# Patient Record
Sex: FEMALE | Race: WHITE | NOT HISPANIC OR LATINO | Employment: UNEMPLOYED | URBAN - NONMETROPOLITAN AREA
[De-identification: names, ages, dates, MRNs, and addresses within clinical notes are randomized per-mention and may not be internally consistent; named-entity substitution may affect disease eponyms.]

---

## 2018-12-17 ENCOUNTER — OFFICE VISIT (OUTPATIENT)
Dept: OBSTETRICS AND GYNECOLOGY | Facility: CLINIC | Age: 33
End: 2018-12-17

## 2018-12-17 VITALS — WEIGHT: 168.2 LBS | SYSTOLIC BLOOD PRESSURE: 112 MMHG | DIASTOLIC BLOOD PRESSURE: 72 MMHG | BODY MASS INDEX: 28.87 KG/M2

## 2018-12-17 DIAGNOSIS — Z98.890 POSTOPERATIVE STATE: Primary | ICD-10-CM

## 2018-12-17 PROCEDURE — 99024 POSTOP FOLLOW-UP VISIT: CPT | Performed by: OBSTETRICS & GYNECOLOGY

## 2018-12-24 NOTE — PROGRESS NOTES
Concepción Eli is a 33 y.o. y/o female.     Chief Complaint: Postoperative ablation    HPI:   33 y.o. y/o No obstetric history on file..  No LMP recorded..  Patient is seen is postop and her endometrial ablation doing well some slight bleeding          Review of Systems   ROS:  CNS: No history of brain malignancy  HEENT: No history of throat cancer  Eye: No history of retinal cancer  Pulmonary: No history of lung cancer                                                                                 Cardiovascular: No history of cardiac tumors  Gastrointestinal: No history of small bowel tumors  Renal: No history of kidney  Musculoskeletal: No history of smooth muscle tumors  Lymphatics: No history of of Hodgkin's disease  Endocrine: No history of thyroid malignancy    The following portions of the patient's history were reviewed and updated as appropriate: allergies, current medications, past family history, past medical history, past social history, past surgical history and problem list.    Allergies   Allergen Reactions   • Avelox [Moxifloxacin]    • Ceclor [Cefaclor]    • Imitrex [Sumatriptan]         Outpatient Medications Prior to Visit   Medication Sig Dispense Refill   • amoxicillin (AMOXIL) 875 MG tablet Take 1 tablet by mouth 2 (Two) Times a Day. 20 tablet 0   • neomycin-polymyxin-hydrocortisone (CORTISPORIN) 3.5-60515-4 otic solution Administer 3 drops into the left ear 4 (Four) Times a Day. 10 mL 0   • norethindrone (AYGESTIN) 5 MG tablet 10 MG PO DAILY 60 tablet 1     No facility-administered medications prior to visit.         The patient has a family history of   Family History   Problem Relation Age of Onset   • Breast cancer Maternal Aunt         Past Medical History:   Diagnosis Date   • Anxiety    • Migraine         OB History     No data available           Social History     Socioeconomic History   • Marital status: Single     Spouse name: Not on file   • Number of children: Not on file   •  Years of education: Not on file   • Highest education level: Not on file   Social Needs   • Financial resource strain: Not on file   • Food insecurity - worry: Not on file   • Food insecurity - inability: Not on file   • Transportation needs - medical: Not on file   • Transportation needs - non-medical: Not on file   Occupational History   • Not on file   Tobacco Use   • Smoking status: Never Smoker   • Smokeless tobacco: Never Used   Substance and Sexual Activity   • Alcohol use: Yes     Comment: socially   • Drug use: No   • Sexual activity: Yes     Partners: Male     Birth control/protection: None   Other Topics Concern   • Not on file   Social History Narrative   • Not on file        Past Surgical History:   Procedure Laterality Date   •  SECTION     • DILATATION AND CURETTAGE     • HYSTEROSCOPY     • TUBAL ABDOMINAL LIGATION          There is no problem list on file for this patient.       Documented Vitals    18 0944   BP: 112/72   Weight: 76.3 kg (168 lb 3.2 oz)   PainSc: 0-No pain        Body mass index is 28.87 kg/m².    Physical Exam lungs clear heart regular rate and rhythm abdomen soft nontender extremities negative    Assessment        Diagnosis Plan   1. Postoperative state                  1. Encouraged in diet and exercise.  2. Handouts on depression, hot flashes, exercise, and vitamin use.   3. Follow-up in6 weeks.  Follow-up sooner as needed.          This document has been electronically signed by Delroy Scott MD on 2018 9:50 PM

## 2019-01-08 ENCOUNTER — TELEPHONE (OUTPATIENT)
Dept: OBSTETRICS AND GYNECOLOGY | Facility: CLINIC | Age: 34
End: 2019-01-08

## 2019-01-08 RX ORDER — VALACYCLOVIR HYDROCHLORIDE 1 G/1
1000 TABLET, FILM COATED ORAL DAILY
Qty: 30 TABLET | Refills: 12 | Status: SHIPPED | OUTPATIENT
Start: 2019-01-08 | End: 2020-09-01

## 2019-01-08 NOTE — TELEPHONE ENCOUNTER
----- Message from Magda Montesinos sent at 1/8/2019 12:36 PM CST -----  Contact: [phone]  PATIENT STATES THAT DR SANDERS WAS SUPPOSE TO SEND IN A PRESCRIPTION FOR FEVER BLISTERS TO [facility].. AND IT HASNT BEEN SENT YET

## 2019-01-08 NOTE — TELEPHONE ENCOUNTER
Called and left a msg on the pt's voicemail that I sent over the rx that she was requesting to [facility] in [place] and to call with any questions.

## 2019-02-22 ENCOUNTER — OFFICE VISIT (OUTPATIENT)
Dept: OBSTETRICS AND GYNECOLOGY | Facility: CLINIC | Age: 34
End: 2019-02-22

## 2019-02-22 VITALS
BODY MASS INDEX: 28.61 KG/M2 | DIASTOLIC BLOOD PRESSURE: 66 MMHG | SYSTOLIC BLOOD PRESSURE: 100 MMHG | HEIGHT: 64 IN | WEIGHT: 167.6 LBS

## 2019-02-22 DIAGNOSIS — N92.0 MENORRHAGIA WITH REGULAR CYCLE: Primary | ICD-10-CM

## 2019-02-22 DIAGNOSIS — Z86.69 HX OF MIGRAINE HEADACHES: ICD-10-CM

## 2019-02-22 PROCEDURE — 99213 OFFICE O/P EST LOW 20 MIN: CPT | Performed by: OBSTETRICS & GYNECOLOGY

## 2019-02-22 RX ORDER — TOPIRAMATE 50 MG/1
50 TABLET, FILM COATED ORAL 2 TIMES DAILY
Qty: 60 TABLET | Refills: 0 | Status: SHIPPED | OUTPATIENT
Start: 2019-02-22 | End: 2019-03-24

## 2019-02-23 NOTE — PROGRESS NOTES
Concepción Eli is a 34 y.o. y/o female.     Chief Complaint: Follow-up treatment menorrhagia; headaches    HPI:   34 y.o. y/o No obstetric history on file..  Patient's last menstrual period was 02/04/2019 (approximate)..  Patient is seen in follow-up after an ablation for menorrhagia she says overall she is satisfied she is having some bleeding but is much reduced only 2 days/month and very light.  I warned her it may increase over time.  She also complains of migraine headaches has been on Topamax in the past having a hard time getting in with a neurologist wants to restart          Review of Systems   ROS:  CNS: No history of brain malignancy  HEENT: No history of throat cancer  Eye: No history of retinal cancer  Pulmonary: No history of lung cancer                                                                                 Cardiovascular: No history of cardiac tumors  Gastrointestinal: No history of small bowel tumors  Renal: No history of kidney  Musculoskeletal: No history of smooth muscle tumors  Lymphatics: No history of of Hodgkin's disease  Endocrine: No history of thyroid malignancy    The following portions of the patient's history were reviewed and updated as appropriate: allergies, current medications, past family history, past medical history, past social history, past surgical history and problem list.    Allergies   Allergen Reactions   • Avelox [Moxifloxacin]    • Ceclor [Cefaclor]    • Imitrex [Sumatriptan]         Outpatient Medications Prior to Visit   Medication Sig Dispense Refill   • valACYclovir (VALTREX) 1000 MG tablet Take 1 tablet by mouth Daily. 30 tablet 12     No facility-administered medications prior to visit.         The patient has a family history of   Family History   Problem Relation Age of Onset   • Breast cancer Maternal Aunt         Past Medical History:   Diagnosis Date   • Anxiety    • Migraine         OB History     No data available           Social History  "    Socioeconomic History   • Marital status: Single     Spouse name: Not on file   • Number of children: Not on file   • Years of education: Not on file   • Highest education level: Not on file   Social Needs   • Financial resource strain: Not on file   • Food insecurity - worry: Not on file   • Food insecurity - inability: Not on file   • Transportation needs - medical: Not on file   • Transportation needs - non-medical: Not on file   Occupational History   • Not on file   Tobacco Use   • Smoking status: Never Smoker   • Smokeless tobacco: Never Used   Substance and Sexual Activity   • Alcohol use: Yes     Comment: socially   • Drug use: No   • Sexual activity: Yes     Partners: Male     Birth control/protection: None   Other Topics Concern   • Not on file   Social History Narrative   • Not on file        Past Surgical History:   Procedure Laterality Date   •  SECTION     • DILATATION AND CURETTAGE     • HYSTEROSCOPY     • TUBAL ABDOMINAL LIGATION          There is no problem list on file for this patient.       Documented Vitals    19 1415   BP: 100/66   Weight: 76 kg (167 lb 9.6 oz)   Height: 162.6 cm (64\")   PainSc: 0-No pain        Body mass index is 28.77 kg/m².    Physical Exam constitutional appears to be in no acute distress lungs clear heart regular rate and rhythm abdomen soft nontender extremities negative neuro physiologic psychiatric O x4    Assessment       No diagnosis found.      Plan         New Medications Ordered This Visit   Medications   • topiramate (TOPAMAX) 50 MG tablet     Sig: Take 1 tablet by mouth 2 (Two) Times a Day for 30 days.     Dispense:  60 tablet     Refill:  0     1. Encouraged in diet and exercise.  2. Handouts on depression, hot flashes, exercise, and vitamin use.   3. Follow-up in 1 year.  Follow-up sooner as needed.          This document has been electronically signed by Delroy Scott MD on 2019 2:06 AM    "

## 2019-04-01 ENCOUNTER — TELEPHONE (OUTPATIENT)
Dept: OBSTETRICS AND GYNECOLOGY | Facility: CLINIC | Age: 34
End: 2019-04-01

## 2019-04-12 RX ORDER — TOPIRAMATE 100 MG/1
100 TABLET, FILM COATED ORAL 2 TIMES DAILY
Qty: 60 TABLET | Refills: 0 | Status: SHIPPED | OUTPATIENT
Start: 2019-04-12 | End: 2019-06-14 | Stop reason: SDUPTHER

## 2019-06-14 RX ORDER — TOPIRAMATE 100 MG/1
100 TABLET, FILM COATED ORAL 2 TIMES DAILY
Qty: 60 TABLET | Refills: 0 | Status: SHIPPED | OUTPATIENT
Start: 2019-06-14 | End: 2019-07-11 | Stop reason: SDUPTHER

## 2019-07-12 RX ORDER — TOPIRAMATE 100 MG/1
100 TABLET, FILM COATED ORAL 2 TIMES DAILY
Qty: 60 TABLET | Refills: 0 | Status: SHIPPED | OUTPATIENT
Start: 2019-07-12 | End: 2019-08-15 | Stop reason: SDUPTHER

## 2019-07-19 ENCOUNTER — TELEPHONE (OUTPATIENT)
Dept: OBSTETRICS AND GYNECOLOGY | Facility: CLINIC | Age: 34
End: 2019-07-19

## 2019-07-19 NOTE — TELEPHONE ENCOUNTER
PT LEFT MSG ON VM THAT SHE IS NEEDING A REFILL ON TOPAMAX 100 MG TO [facility] IN [place].  HER PHONE NUMBER [phone]

## 2019-08-15 RX ORDER — TOPIRAMATE 100 MG/1
100 TABLET, FILM COATED ORAL 2 TIMES DAILY
Qty: 60 TABLET | Refills: 0 | Status: SHIPPED | OUTPATIENT
Start: 2019-08-15 | End: 2019-09-29 | Stop reason: SDUPTHER

## 2019-09-30 RX ORDER — TOPIRAMATE 100 MG/1
100 TABLET, FILM COATED ORAL 2 TIMES DAILY
Qty: 60 TABLET | Refills: 0 | Status: SHIPPED | OUTPATIENT
Start: 2019-09-30 | End: 2019-10-27 | Stop reason: SDUPTHER

## 2019-10-28 RX ORDER — TOPIRAMATE 100 MG/1
100 TABLET, FILM COATED ORAL 2 TIMES DAILY
Qty: 60 TABLET | Refills: 0 | Status: SHIPPED | OUTPATIENT
Start: 2019-10-28 | End: 2019-11-27

## 2019-12-17 RX ORDER — TOPIRAMATE 100 MG/1
100 TABLET, FILM COATED ORAL 2 TIMES DAILY
Qty: 60 TABLET | Refills: 0 | OUTPATIENT
Start: 2019-12-17 | End: 2020-01-16

## 2019-12-18 ENCOUNTER — TELEPHONE (OUTPATIENT)
Dept: OBSTETRICS AND GYNECOLOGY | Facility: CLINIC | Age: 34
End: 2019-12-18

## 2020-01-17 DIAGNOSIS — Z86.69 HX OF MIGRAINE HEADACHES: Primary | ICD-10-CM

## 2020-01-17 RX ORDER — TOPIRAMATE 100 MG/1
100 TABLET, FILM COATED ORAL 2 TIMES DAILY
Qty: 60 TABLET | Refills: 11 | Status: SHIPPED | OUTPATIENT
Start: 2020-01-17 | End: 2021-01-19

## 2020-01-31 ENCOUNTER — LAB (OUTPATIENT)
Dept: LAB | Facility: HOSPITAL | Age: 35
End: 2020-01-31

## 2020-01-31 ENCOUNTER — PROCEDURE VISIT (OUTPATIENT)
Dept: OBSTETRICS AND GYNECOLOGY | Facility: CLINIC | Age: 35
End: 2020-01-31

## 2020-01-31 VITALS
DIASTOLIC BLOOD PRESSURE: 62 MMHG | WEIGHT: 138.2 LBS | SYSTOLIC BLOOD PRESSURE: 100 MMHG | BODY MASS INDEX: 23.6 KG/M2 | HEIGHT: 64 IN

## 2020-01-31 DIAGNOSIS — R22.0 MASS OF CHIN: Primary | ICD-10-CM

## 2020-01-31 DIAGNOSIS — Z12.4 CERVICAL CANCER SCREENING: ICD-10-CM

## 2020-01-31 DIAGNOSIS — N94.10 FEMALE DYSPAREUNIA: ICD-10-CM

## 2020-01-31 LAB
BASOPHILS # BLD AUTO: 0.08 10*3/MM3 (ref 0–0.2)
BASOPHILS NFR BLD AUTO: 1.3 % (ref 0–1.5)
CRP SERPL-MCNC: <0.03 MG/DL (ref 0–0.5)
DEPRECATED RDW RBC AUTO: 37.5 FL (ref 37–54)
EOSINOPHIL # BLD AUTO: 0.18 10*3/MM3 (ref 0–0.4)
EOSINOPHIL NFR BLD AUTO: 2.8 % (ref 0.3–6.2)
ERYTHROCYTE [DISTWIDTH] IN BLOOD BY AUTOMATED COUNT: 11.9 % (ref 12.3–15.4)
HCG SERPL QL: NEGATIVE
HCT VFR BLD AUTO: 40.2 % (ref 34–46.6)
HGB BLD-MCNC: 13.8 G/DL (ref 12–15.9)
IMM GRANULOCYTES # BLD AUTO: 0.01 10*3/MM3 (ref 0–0.05)
IMM GRANULOCYTES NFR BLD AUTO: 0.2 % (ref 0–0.5)
LYMPHOCYTES # BLD AUTO: 1.6 10*3/MM3 (ref 0.7–3.1)
LYMPHOCYTES NFR BLD AUTO: 25 % (ref 19.6–45.3)
MCH RBC QN AUTO: 29.9 PG (ref 26.6–33)
MCHC RBC AUTO-ENTMCNC: 34.3 G/DL (ref 31.5–35.7)
MCV RBC AUTO: 87 FL (ref 79–97)
MONOCYTES # BLD AUTO: 0.5 10*3/MM3 (ref 0.1–0.9)
MONOCYTES NFR BLD AUTO: 7.8 % (ref 5–12)
NEUTROPHILS # BLD AUTO: 4.02 10*3/MM3 (ref 1.7–7)
NEUTROPHILS NFR BLD AUTO: 62.9 % (ref 42.7–76)
NRBC BLD AUTO-RTO: 0 /100 WBC (ref 0–0.2)
PLATELET # BLD AUTO: 246 10*3/MM3 (ref 140–450)
PMV BLD AUTO: 12.4 FL (ref 6–12)
RBC # BLD AUTO: 4.62 10*6/MM3 (ref 3.77–5.28)
WBC NRBC COR # BLD: 6.39 10*3/MM3 (ref 3.4–10.8)

## 2020-01-31 PROCEDURE — 85025 COMPLETE CBC W/AUTO DIFF WBC: CPT

## 2020-01-31 PROCEDURE — G0123 SCREEN CERV/VAG THIN LAYER: HCPCS | Performed by: OBSTETRICS & GYNECOLOGY

## 2020-01-31 PROCEDURE — 87624 HPV HI-RISK TYP POOLED RSLT: CPT | Performed by: OBSTETRICS & GYNECOLOGY

## 2020-01-31 PROCEDURE — 36415 COLL VENOUS BLD VENIPUNCTURE: CPT

## 2020-01-31 PROCEDURE — 99395 PREV VISIT EST AGE 18-39: CPT | Performed by: OBSTETRICS & GYNECOLOGY

## 2020-01-31 PROCEDURE — 84703 CHORIONIC GONADOTROPIN ASSAY: CPT

## 2020-01-31 PROCEDURE — 86140 C-REACTIVE PROTEIN: CPT

## 2020-01-31 NOTE — PROGRESS NOTES
"Chief complaint here for Pap smear; dyspareunia    SUBJECTIVE:   34 y.o. female for annual routine Pap and checkup.  Patient complains of also dyspareunia is been bothersome significantly for about 3 to 4 months worse with deep penetration sharp in nature.  She has had laparoscopy in the past at worst 6 of 10  Current Outpatient Medications   Medication Sig Dispense Refill   • topiramate (TOPAMAX) 100 MG tablet Take 1 tablet by mouth 2 (Two) Times a Day. 60 tablet 11   • valACYclovir (VALTREX) 1000 MG tablet Take 1 tablet by mouth Daily. 30 tablet 12     No current facility-administered medications for this visit.      Allergies: Avelox [moxifloxacin]; Ceclor [cefaclor]; and Imitrex [sumatriptan]   Patient's last menstrual period was 01/21/2020.    ROS:  Feeling well. No dyspnea or chest pain on exertion.  No abdominal pain, change in bowel habits, black or bloody stools.  No urinary tract symptoms. GYN ROS: normal menses, no abnormal bleeding, pelvic pain or discharge, no breast pain or new or enlarging lumps on self exam. No neurological complaints.    OBJECTIVE:   The patient appears well, alert, oriented x 3, in no distress.  /62   Ht 162.6 cm (64\")   Wt 62.7 kg (138 lb 3.2 oz)   LMP 01/21/2020   BMI 23.72 kg/m²   ENT normal.  Neck supple. No adenopathy or thyromegaly. CAT. Lungs are clear, good air entry, no wheezes, rhonchi or rales. S1 and S2 normal, no murmurs, regular rate and rhythm. Abdomen soft without tenderness, guarding, mass or organomegaly. Extremities show no edema, normal peripheral pulses. Neurological is normal, no focal findings.    BREAST EXAM: not examined    PELVIC EXAM: normal external genitalia, vulva, vagina, cervix, uterus and adnexa mild diffuse pelvic tenderness    ASSESSMENT:   well woman    PLAN:   mammogram  pap smear  return annually or prn  We will get ultrasound and laboratory work to evaluate dyspareunia  "

## 2020-02-04 LAB
GEN CATEG CVX/VAG CYTO-IMP: NORMAL
LAB AP CASE REPORT: NORMAL
LAB AP GYN ADDITIONAL INFORMATION: NORMAL
PATH INTERP SPEC-IMP: NORMAL
STAT OF ADQ CVX/VAG CYTO-IMP: NORMAL

## 2020-02-06 ENCOUNTER — OFFICE VISIT (OUTPATIENT)
Dept: OBSTETRICS AND GYNECOLOGY | Facility: CLINIC | Age: 35
End: 2020-02-06

## 2020-02-06 VITALS
HEIGHT: 64 IN | BODY MASS INDEX: 23.76 KG/M2 | SYSTOLIC BLOOD PRESSURE: 128 MMHG | DIASTOLIC BLOOD PRESSURE: 76 MMHG | WEIGHT: 139.2 LBS

## 2020-02-06 DIAGNOSIS — R22.0 MASS OF CHIN: ICD-10-CM

## 2020-02-06 DIAGNOSIS — N80.03 ADENOMYOSIS: ICD-10-CM

## 2020-02-06 DIAGNOSIS — R10.32 LEFT LOWER QUADRANT PAIN: ICD-10-CM

## 2020-02-06 DIAGNOSIS — R10.2 FEMALE PELVIC PAIN: Primary | ICD-10-CM

## 2020-02-06 LAB — HPV I/H RISK 4 DNA CVX QL PROBE+SIG AMP: NEGATIVE

## 2020-02-06 PROCEDURE — 99213 OFFICE O/P EST LOW 20 MIN: CPT | Performed by: OBSTETRICS & GYNECOLOGY

## 2020-02-06 NOTE — PROGRESS NOTES
Chief complaint follow-up pelvic pain left lower quadrant pain area left mandible    Ultrasound today is reviewed with the patient it is really pretty much negative except that the uterine images are suggestive of adenomyosis.  I reviewed the risks benefits alternatives of another laparoscopy versus hysterectomy.  He wants to consider with her family probably be followed conservatively for now.    Preliminary on the ultrasound of the mandible shows a relatively small lymph node and she says the area is getting smaller and it does seem to be getting smaller.  We will plan to recheck for both issues in about 4 weeks. I spent 17 minutes making  more than 50% of this encounter, being spent in counseling and coordination of care.

## 2020-02-17 ENCOUNTER — TELEPHONE (OUTPATIENT)
Dept: OBSTETRICS AND GYNECOLOGY | Facility: CLINIC | Age: 35
End: 2020-02-17

## 2020-02-18 DIAGNOSIS — B37.31 VAGINAL YEAST INFECTION: Primary | ICD-10-CM

## 2020-02-18 RX ORDER — FLUCONAZOLE 150 MG/1
150 TABLET ORAL DAILY
Qty: 2 TABLET | Refills: 2 | OUTPATIENT
Start: 2020-02-18 | End: 2021-04-05

## 2020-03-06 ENCOUNTER — APPOINTMENT (OUTPATIENT)
Dept: LAB | Facility: HOSPITAL | Age: 35
End: 2020-03-06

## 2020-03-06 ENCOUNTER — OFFICE VISIT (OUTPATIENT)
Dept: OBSTETRICS AND GYNECOLOGY | Facility: CLINIC | Age: 35
End: 2020-03-06

## 2020-03-06 VITALS
BODY MASS INDEX: 23.52 KG/M2 | SYSTOLIC BLOOD PRESSURE: 104 MMHG | WEIGHT: 137.8 LBS | HEIGHT: 64 IN | DIASTOLIC BLOOD PRESSURE: 70 MMHG

## 2020-03-06 DIAGNOSIS — R22.0 MASS OF CHIN: ICD-10-CM

## 2020-03-06 DIAGNOSIS — R10.32 LEFT LOWER QUADRANT PAIN: ICD-10-CM

## 2020-03-06 DIAGNOSIS — R10.2 FEMALE PELVIC PAIN: Primary | ICD-10-CM

## 2020-03-06 LAB
PROLACTIN SERPL-MCNC: 9.25 NG/ML (ref 4.79–23.3)
T4 FREE SERPL-MCNC: 1.08 NG/DL (ref 0.93–1.7)
TSH SERPL DL<=0.05 MIU/L-ACNC: 1.87 UIU/ML (ref 0.27–4.2)

## 2020-03-06 PROCEDURE — 84146 ASSAY OF PROLACTIN: CPT | Performed by: OBSTETRICS & GYNECOLOGY

## 2020-03-06 PROCEDURE — 84443 ASSAY THYROID STIM HORMONE: CPT | Performed by: OBSTETRICS & GYNECOLOGY

## 2020-03-06 PROCEDURE — 36415 COLL VENOUS BLD VENIPUNCTURE: CPT | Performed by: OBSTETRICS & GYNECOLOGY

## 2020-03-06 PROCEDURE — 99213 OFFICE O/P EST LOW 20 MIN: CPT | Performed by: OBSTETRICS & GYNECOLOGY

## 2020-03-06 PROCEDURE — 84439 ASSAY OF FREE THYROXINE: CPT | Performed by: OBSTETRICS & GYNECOLOGY

## 2020-03-06 NOTE — PROGRESS NOTES
Concepción Eli is a 35 y.o. y/o female.     Chief Complaint: Follow-up    HPI:   35 y.o. No obstetric history on file..  Patient's last menstrual period was 03/05/2020..  Patient is seen in follow-up for pelvic pain and mass left shin.  She continues to have pelvic pain left side worse on menstrual cycle at worst 6 of 10 radiation to back somewhat better nonsteroidal anti-inflammatories          Review of Systems   ROS:  CNS: No history of brain malignancy.  HEENT: No history of throat cancer.  Eye: No history of retinal cancer.  Pulmonary: No history of lung cancer.                                                                                 Cardiovascular: No history of cardiac tumors.  Gastrointestinal: No history of small bowel tumors.  Renal: No history of kidney malignancy.  Musculoskeletal: No history of smooth muscle tumors.  Lymphatics: No history of Hodgkin's disease.  Endocrine: No history of thyroid malignancy.    The following portions of the patient's history were reviewed and updated as appropriate: allergies, current medications, past family history, past medical history, past social history, past surgical history and problem list.    Allergies   Allergen Reactions   • Avelox [Moxifloxacin] Hives   • Ceclor [Cefaclor] Hives   • Imitrex [Sumatriptan] Hives        Outpatient Medications Prior to Visit   Medication Sig Dispense Refill   • topiramate (TOPAMAX) 100 MG tablet Take 1 tablet by mouth 2 (Two) Times a Day. 60 tablet 11   • valACYclovir (VALTREX) 1000 MG tablet Take 1 tablet by mouth Daily. 30 tablet 12   • fluconazole (DIFLUCAN) 150 MG tablet Take 1 tablet by mouth Daily. 2 tablet 2     No facility-administered medications prior to visit.         The patient has a family history of   Family History   Problem Relation Age of Onset   • Breast cancer Maternal Aunt         Past Medical History:   Diagnosis Date   • Anxiety    • Migraine         OB History    None          Social History  "    Socioeconomic History   • Marital status: Single     Spouse name: Not on file   • Number of children: Not on file   • Years of education: Not on file   • Highest education level: Not on file   Tobacco Use   • Smoking status: Never Smoker   • Smokeless tobacco: Never Used   Substance and Sexual Activity   • Alcohol use: Yes     Comment: socially   • Drug use: No   • Sexual activity: Yes     Partners: Male     Birth control/protection: None        Past Surgical History:   Procedure Laterality Date   •  SECTION     • DILATATION AND CURETTAGE     • HYSTEROSCOPY     • TUBAL ABDOMINAL LIGATION          Patient Active Problem List   Diagnosis   • Cervical cancer screening   • Female dyspareunia   • Mass of chin   • Female pelvic pain   • Adenomyosis   • Left lower quadrant pain        Documented Vitals    20 0836   BP: 104/70   Weight: 62.5 kg (137 lb 12.8 oz)   Height: 162.6 cm (64\")   PainSc:   4        Body mass index is 23.65 kg/m².    Physical Exam  Constitutional: Appears to be in no acute distress; Eyes: sclera normal; Endocrine system: thyroid palpate is normal; Pulmonary system: lungs clear; Cardiovascular system: heart regular rate and rhythm; Gastrointestinal system: abdomen soft nontender, active bowel sounds; Urologic system: CVA negative; Psychiatric: appropriate insight; Neurologic: gait within normal limits the mass on the left mandible has almost resolved    Laboratory Data:   No results for input(s): GLUCOSE, BUN, CREATININE, NA, K, CL, CO2, CALCIUM, PROTEINTOT, ALBUMIN, ALT, AST, ALKPHOS, BILITOT, EGFRIFNONA, GLOB, AGRATIO, BCR, ANIONGAP, BILIDIR, INDBILI in the last 70336 hours.  Lab Results - Last 18 Months   Lab Units 20  1041   WBC 10*3/mm3 6.39   RBC 10*6/mm3 4.62   HEMOGLOBIN g/dL 13.8   HEMATOCRIT % 40.2   MCV fL 87.0   MCH pg 29.9   MCHC g/dL 34.3   RDW % 11.9*   RDW-SD fl 37.5   MPV fL 12.4*   PLATELETS 10*3/mm3 246     Lab Results - Last 18 Months   Lab Units " 01/31/20  1041   HCG QUALITATIVE  Negative       Assessment        Diagnosis Plan   1. Female pelvic pain  TSH+Free T4    Prolactin   2. Mass of chin   mass resolved   3. Left lower quadrant pain   discussed options with patient wants to be followed conservatively for now         Plan       No orders of the defined types were placed in this encounter.            This document has been electronically signed by Delroy Scott MD on March 6, 2020 2:23 PM    Please note that portions of this note were completed with a voice recognition program.

## 2020-03-09 ENCOUNTER — DOCUMENTATION (OUTPATIENT)
Dept: OBSTETRICS AND GYNECOLOGY | Facility: CLINIC | Age: 35
End: 2020-03-09

## 2020-09-01 RX ORDER — VALACYCLOVIR HYDROCHLORIDE 1 G/1
TABLET, FILM COATED ORAL
Qty: 30 TABLET | Refills: 12 | Status: SHIPPED | OUTPATIENT
Start: 2020-09-01 | End: 2021-03-12 | Stop reason: SDUPTHER

## 2020-09-18 ENCOUNTER — OFFICE VISIT (OUTPATIENT)
Dept: OBSTETRICS AND GYNECOLOGY | Facility: CLINIC | Age: 35
End: 2020-09-18

## 2020-09-18 VITALS
WEIGHT: 145 LBS | DIASTOLIC BLOOD PRESSURE: 78 MMHG | SYSTOLIC BLOOD PRESSURE: 100 MMHG | BODY MASS INDEX: 24.75 KG/M2 | HEIGHT: 64 IN

## 2020-09-18 DIAGNOSIS — N64.4 BREAST PAIN, LEFT: Primary | ICD-10-CM

## 2020-09-18 PROCEDURE — 99213 OFFICE O/P EST LOW 20 MIN: CPT | Performed by: NURSE PRACTITIONER

## 2020-09-18 NOTE — PROGRESS NOTES
Subjective   Chief Complaint   Patient presents with   • Breast Mass     Concepción Eli is a 35 y.o. year old  presenting to be seen because of left breast pain at the 3 o'clock position. The pain started about 6 weeks ago and feels like a burning sensation that goes from the nipple into the axilla. It is achy nearly constantly and the burning sensation is intermittent but several times throughout the day. She has not taken any OTC meds or tried any comfort measures to relieve the discomfort and hasn't noticed that anything makes it worse..    · Last mammogram: was done on approximately 2018 and the result was: Birads II (Benign findings).  · Last breast MRI: was done on approximately 7/30/15 and the result was Birads II (Benign findings).    Maternal aunt dx with BRCA at age 39. Her mother has had a lumpectomy but it was benign and her younger sister has had a couple of breast biopsies but they were benign as well.      The following portions of the patient's history were reviewed and updated as appropriate:problem list, current medications, allergies, past family history, past medical history, past social history and past surgical history    Review of Systems   Constitutional: Negative for activity change, appetite change, chills, diaphoresis, fatigue, fever, unexpected weight gain and unexpected weight loss.   Respiratory: Negative for chest tightness and shortness of breath.    Cardiovascular: Negative for chest pain and palpitations.   Endocrine: Negative.    Genitourinary: Positive for breast pain. Negative for amenorrhea, breast discharge, breast lump, menstrual problem and pelvic pain.   Musculoskeletal: Negative for myalgias.   Skin: Negative for color change, dry skin, pallor, rash and skin lesions.   Allergic/Immunologic: Positive for environmental allergies. Negative for food allergies and immunocompromised state.   Psychiatric/Behavioral: Negative for agitation, dysphoric mood, sleep disturbance,  "depressed mood and stress. The patient is nervous/anxious.         Objective   /78   Ht 162.6 cm (64\")   Wt 65.8 kg (145 lb)   LMP 08/28/2020 (Approximate)   Breastfeeding No   BMI 24.89 kg/m²     General:  well developed; well nourished  no acute distress  appears stated age  mildly distressed   Breasts:  Examined in upright and supine position  Symmetric without masses or skin dimpling  Nipples normal without inversion, lesions or discharge  There are no palpable axillary nodes  Fibrocystic changes are present both breasts without a discrete mass     Lab Review   No data reviewed    Imaging   Breast MRI report  Breast ultrasound report  Mammogram report    Diagnoses and all orders for this visit:    Breast pain, left  -     US Breast Left Limited      Breast u/s was negative for any concerning lesions. Discussed comfort measures with pt.        This note was electronically signed.    FELICITA Monroy  September 18, 2020  "

## 2021-01-19 DIAGNOSIS — Z86.69 HX OF MIGRAINE HEADACHES: ICD-10-CM

## 2021-01-19 RX ORDER — TOPIRAMATE 100 MG/1
TABLET, FILM COATED ORAL
Qty: 60 TABLET | Refills: 11 | Status: SHIPPED | OUTPATIENT
Start: 2021-01-19 | End: 2021-03-12 | Stop reason: SDUPTHER

## 2021-03-12 ENCOUNTER — OFFICE VISIT (OUTPATIENT)
Dept: OBSTETRICS AND GYNECOLOGY | Facility: CLINIC | Age: 36
End: 2021-03-12

## 2021-03-12 VITALS
DIASTOLIC BLOOD PRESSURE: 68 MMHG | WEIGHT: 146.2 LBS | SYSTOLIC BLOOD PRESSURE: 124 MMHG | BODY MASS INDEX: 24.96 KG/M2 | HEIGHT: 64 IN

## 2021-03-12 DIAGNOSIS — R10.2 FEMALE PELVIC PAIN: ICD-10-CM

## 2021-03-12 DIAGNOSIS — A60.04 HERPES SIMPLEX VULVOVAGINITIS: ICD-10-CM

## 2021-03-12 DIAGNOSIS — Z86.69 HX OF MIGRAINE HEADACHES: ICD-10-CM

## 2021-03-12 DIAGNOSIS — R10.32 LEFT LOWER QUADRANT PAIN: ICD-10-CM

## 2021-03-12 DIAGNOSIS — Z12.31 ENCOUNTER FOR SCREENING MAMMOGRAM FOR MALIGNANT NEOPLASM OF BREAST: Primary | ICD-10-CM

## 2021-03-12 DIAGNOSIS — Z80.3 FAMILY HISTORY OF BREAST CANCER: ICD-10-CM

## 2021-03-12 PROCEDURE — 99214 OFFICE O/P EST MOD 30 MIN: CPT | Performed by: OBSTETRICS & GYNECOLOGY

## 2021-03-12 RX ORDER — VALACYCLOVIR HYDROCHLORIDE 1 G/1
1000 TABLET, FILM COATED ORAL DAILY
Qty: 30 TABLET | Refills: 12 | OUTPATIENT
Start: 2021-03-12 | End: 2021-04-05

## 2021-03-12 RX ORDER — TOPIRAMATE 100 MG/1
100 TABLET, FILM COATED ORAL 2 TIMES DAILY
Qty: 60 TABLET | Refills: 11 | Status: SHIPPED | OUTPATIENT
Start: 2021-03-12

## 2021-03-18 ENCOUNTER — TELEPHONE (OUTPATIENT)
Dept: OBSTETRICS AND GYNECOLOGY | Facility: CLINIC | Age: 36
End: 2021-03-18

## 2021-03-20 ENCOUNTER — TELEPHONE (OUTPATIENT)
Dept: OBSTETRICS AND GYNECOLOGY | Facility: CLINIC | Age: 36
End: 2021-03-20

## 2021-03-20 NOTE — TELEPHONE ENCOUNTER
Mammogram reviewed with patient breast self-examination strongly encouraged given family history of breast cancer

## 2021-08-25 PROCEDURE — 87635 SARS-COV-2 COVID-19 AMP PRB: CPT | Performed by: NURSE PRACTITIONER

## 2021-09-07 PROBLEM — U09.9 POST-COVID SYNDROME: Status: ACTIVE | Noted: 2021-09-07

## 2022-01-16 PROCEDURE — 87635 SARS-COV-2 COVID-19 AMP PRB: CPT | Performed by: NURSE PRACTITIONER
